# Patient Record
Sex: FEMALE | Race: BLACK OR AFRICAN AMERICAN | NOT HISPANIC OR LATINO | URBAN - METROPOLITAN AREA
[De-identification: names, ages, dates, MRNs, and addresses within clinical notes are randomized per-mention and may not be internally consistent; named-entity substitution may affect disease eponyms.]

---

## 2021-08-23 ENCOUNTER — HOSPITAL ENCOUNTER (EMERGENCY)
Facility: HOSPITAL | Age: 31
Discharge: HOME | End: 2021-08-23
Attending: EMERGENCY MEDICINE
Payer: MEDICAID

## 2021-08-23 VITALS
TEMPERATURE: 99 F | HEART RATE: 84 BPM | OXYGEN SATURATION: 99 % | DIASTOLIC BLOOD PRESSURE: 95 MMHG | WEIGHT: 225 LBS | RESPIRATION RATE: 19 BRPM | BODY MASS INDEX: 41.41 KG/M2 | SYSTOLIC BLOOD PRESSURE: 143 MMHG | HEIGHT: 62 IN

## 2021-08-23 DIAGNOSIS — Z00.00 NORMAL EXAM: Primary | ICD-10-CM

## 2021-08-23 PROCEDURE — 99282 EMERGENCY DEPT VISIT SF MDM: CPT | Mod: ER

## 2024-03-26 ENCOUNTER — HOSPITAL ENCOUNTER (EMERGENCY)
Facility: HOSPITAL | Age: 34
Discharge: HOME | End: 2024-03-26
Attending: EMERGENCY MEDICINE
Payer: COMMERCIAL

## 2024-03-26 VITALS
TEMPERATURE: 98 F | HEIGHT: 62 IN | HEART RATE: 59 BPM | WEIGHT: 266 LBS | RESPIRATION RATE: 18 BRPM | DIASTOLIC BLOOD PRESSURE: 95 MMHG | BODY MASS INDEX: 48.95 KG/M2 | SYSTOLIC BLOOD PRESSURE: 137 MMHG | OXYGEN SATURATION: 99 %

## 2024-03-26 DIAGNOSIS — S61.101A AVULSION OF NAIL OF RIGHT THUMB: Primary | ICD-10-CM

## 2024-03-26 PROCEDURE — 25000003 PHARM REV CODE 250: Mod: ER

## 2024-03-26 PROCEDURE — 99283 EMERGENCY DEPT VISIT LOW MDM: CPT | Mod: ER

## 2024-03-26 RX ORDER — LIDOCAINE HYDROCHLORIDE 10 MG/ML
5 INJECTION, SOLUTION EPIDURAL; INFILTRATION; INTRACAUDAL; PERINEURAL
Status: COMPLETED | OUTPATIENT
Start: 2024-03-26 | End: 2024-03-26

## 2024-03-26 RX ORDER — CETIRIZINE HYDROCHLORIDE 10 MG/1
10 TABLET ORAL DAILY
COMMUNITY

## 2024-03-26 RX ORDER — MUPIROCIN 20 MG/G
OINTMENT TOPICAL 3 TIMES DAILY
Qty: 22 G | Refills: 0 | Status: SHIPPED | OUTPATIENT
Start: 2024-03-26

## 2024-03-26 RX ADMIN — LIDOCAINE HYDROCHLORIDE 50 MG: 10 INJECTION, SOLUTION EPIDURAL; INFILTRATION; INTRACAUDAL at 09:03

## 2024-03-26 RX ADMIN — LIDOCAINE HYDROCHLORIDE 50 MG: 10 INJECTION, SOLUTION EPIDURAL; INFILTRATION; INTRACAUDAL; PERINEURAL at 08:03

## 2024-03-26 NOTE — DISCHARGE INSTRUCTIONS
Keep the area clean and dry with a dressing over the nail to keep it secure. It will fall off in time. Apply topical antibiotic ointment daily. Return to ER if the swelling, redness, or pain increases.    You can rotate tylenol and ibuprofen for pain as needed. Ice can help swelling and pain as well.

## 2024-03-26 NOTE — ED PROVIDER NOTES
Encounter Date: 3/26/2024       History     Chief Complaint   Patient presents with    Hand Injury     R thumb vs chair.  Pt C/O false fingernail and fingernail coming off after R thumb hitting chair.      Lorie Jimenez is a 33 y.o. female with no significant medical history presenting to the Emergency Department for nail injury. She struck her thumb on the back of a chair and her right thumb fingernail is partially avulsed. She has acrylics applied. No other injury or trauma. Full ROM. No numbness or tingling. No bleeding.         The history is provided by the patient.     Review of patient's allergies indicates:  No Known Allergies  No past medical history on file.  Past Surgical History:   Procedure Laterality Date     SECTION      x3     No family history on file.  Social History     Tobacco Use    Smoking status: Never    Smokeless tobacco: Never   Substance Use Topics    Alcohol use: Never    Drug use: Never     Review of Systems   Constitutional:  Negative for chills and fever.   Skin:  Positive for color change and wound.   Psychiatric/Behavioral:  Negative for agitation and confusion.        Physical Exam     Initial Vitals   BP Pulse Resp Temp SpO2   24 0831 24 0831 24 0831 24 0831 24 0834   (!) 137/95 (!) 59 18 97.8 °F (36.6 °C) 99 %      MAP       --                Physical Exam    Nursing note and vitals reviewed.  Constitutional: She appears well-developed and well-nourished. She is not diaphoretic.  Non-toxic appearance. No distress.   HENT:   Head: Normocephalic and atraumatic.   Right Ear: Hearing and external ear normal.   Left Ear: Hearing and external ear normal.   Eyes: EOM are normal.   Neck: Neck supple.   Normal range of motion.  Cardiovascular:  Normal rate.           Pulmonary/Chest: No respiratory distress.   Abdominal: She exhibits no distension.   Musculoskeletal:         General: Normal range of motion.      Cervical back: Normal range of  motion and neck supple. Normal range of motion.      Comments: Right thumb- fingernail partially avulsed medially. Lateral root intact. No visible nailbed laceration.  Brisk capillary refill. FROM.      Neurological: She is alert and oriented to person, place, and time. GCS score is 15. GCS eye subscore is 4. GCS verbal subscore is 5. GCS motor subscore is 6.   Skin: Skin is warm and dry.   Psychiatric: She has a normal mood and affect. Her behavior is normal. Judgment and thought content normal.         ED Course   Nail Removal    Date/Time: 3/26/2024 9:27 AM    Performed by: Velia Izquierdo PA-C  Authorized by: Denys Gilbert MD  Location: right hand  Location details: right thumb  Anesthesia: digital block    Anesthesia:  Local Anesthetic: lidocaine 1% without epinephrine  Anesthetic total: 6 mL  Preparation: skin prepped with alcohol  Amount removed: partial (traumatic partial nail avulsion)  Wedge excision of skin of nail fold: no  Nail bed sutured: no  Nail matrix removed: none  Dressing: dressing applied  Patient tolerance: Patient tolerated the procedure well with no immediate complications  Comments: Nail replaced into nail fold. Dressing applied        Labs Reviewed - No data to display       Imaging Results    None          Medications   LIDOcaine (PF) 10 mg/ml (1%) injection 50 mg (has no administration in time range)   LIDOcaine (PF) 10 mg/ml (1%) injection 50 mg (50 mg Infiltration Given 3/26/24 0845)     Medical Decision Making  Right thumbnail partially avulsed.  Lateral root is intact.  There is no visible nailbed laceration. Digital block was performed. Wound was extensively cleaned and nail was replaced into the nail fold.  Patient is aware that nail will likely turn black and fall off in time. There is chance it could be deformed.  Discussed wound care and signs of infection. Apply topical abx daily. Clean with gentle soap and water, no soaking. ED return precautions discussed.      Risk  Prescription drug management.                                      Clinical Impression:  Final diagnoses:  [S61.101A] Avulsion of nail of right thumb (Primary)          ED Disposition Condition    Discharge Stable          ED Prescriptions       Medication Sig Dispense Start Date End Date Auth. Provider    mupirocin (BACTROBAN) 2 % ointment Apply topically 3 (three) times daily. 22 g 3/26/2024 -- Velia Izquierdo PA-C          Follow-up Information       Follow up With Specialties Details Why Contact Info    Primary Care Provider  Schedule an appointment as soon as possible for a visit in 1 week As needed              Velia Izquierdo PA-C  03/26/24 0970

## 2024-03-26 NOTE — Clinical Note
Zheng Ortega accompanied their family member to the emergency department on 3/26/2024. They may return to work on 03/26/2024.      If you have any questions or concerns, please don't hesitate to call.      Velia Izquierdo PA-C

## 2024-03-26 NOTE — Clinical Note
"Lorie Fletcher"Tony was seen and treated in our emergency department on 3/26/2024.  She may return to school on 03/27/2024.      If you have any questions or concerns, please don't hesitate to call.      Velia Izquierdo PA-C"